# Patient Record
Sex: MALE | Race: WHITE
[De-identification: names, ages, dates, MRNs, and addresses within clinical notes are randomized per-mention and may not be internally consistent; named-entity substitution may affect disease eponyms.]

---

## 2022-06-17 ENCOUNTER — HOSPITAL ENCOUNTER (INPATIENT)
Dept: HOSPITAL 4 - SED | Age: 72
LOS: 5 days | Discharge: NURSING, REHAB OR LONG-TERM CARE | DRG: 481 | End: 2022-06-22
Attending: INTERNAL MEDICINE | Admitting: INTERNAL MEDICINE
Payer: COMMERCIAL

## 2022-06-17 VITALS — BODY MASS INDEX: 31.08 KG/M2 | HEIGHT: 75 IN | WEIGHT: 250 LBS

## 2022-06-17 VITALS — SYSTOLIC BLOOD PRESSURE: 135 MMHG

## 2022-06-17 DIAGNOSIS — D69.9: ICD-10-CM
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
## 2022-06-17 DIAGNOSIS — I42.0: ICD-10-CM

## 2022-06-17 DIAGNOSIS — Z79.899: ICD-10-CM
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
## 2022-06-17 DIAGNOSIS — E66.9: ICD-10-CM

## 2022-06-17 DIAGNOSIS — E78.5: ICD-10-CM

## 2022-06-17 DIAGNOSIS — I48.92: ICD-10-CM

## 2022-06-17 DIAGNOSIS — Z20.822: ICD-10-CM

## 2022-06-17 DIAGNOSIS — I50.9: ICD-10-CM
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
## 2022-06-17 DIAGNOSIS — I11.0: ICD-10-CM

## 2022-06-17 DIAGNOSIS — S72.001A: Primary | ICD-10-CM

## 2022-06-17 DIAGNOSIS — I48.20: ICD-10-CM

## 2022-06-17 LAB
ALBUMIN SERPL BCP-MCNC: 3.6 G/DL (ref 3.4–4.8)
ALT SERPL W P-5'-P-CCNC: 14 U/L (ref 12–78)
ANION GAP SERPL CALCULATED.3IONS-SCNC: 6 MMOL/L (ref 5–15)
AST SERPL W P-5'-P-CCNC: 12 U/L (ref 10–37)
BASOPHILS # BLD AUTO: 0 K/UL (ref 0–0.2)
BASOPHILS NFR BLD AUTO: 0.4 % (ref 0–2)
BILIRUB SERPL-MCNC: 1.2 MG/DL (ref 0–1)
BUN SERPL-MCNC: 17 MG/DL (ref 8–21)
CALCIUM SERPL-MCNC: 8.1 MG/DL (ref 8.4–11)
CHLORIDE SERPL-SCNC: 104 MMOL/L (ref 98–107)
CREAT SERPL-MCNC: 0.96 MG/DL (ref 0.55–1.3)
EOSINOPHIL # BLD AUTO: 0.1 K/UL (ref 0–0.4)
EOSINOPHIL NFR BLD AUTO: 1.4 % (ref 0–4)
ERYTHROCYTE [DISTWIDTH] IN BLOOD BY AUTOMATED COUNT: 14.1 % (ref 9–15)
GFR SERPL CREATININE-BSD FRML MDRD: (no result) ML/MIN (ref 90–?)
GLUCOSE SERPL-MCNC: 150 MG/DL (ref 70–99)
HCT VFR BLD AUTO: 41.4 % (ref 36–54)
HGB BLD-MCNC: 14.3 G/DL (ref 14–18)
INR PPP: 1.1 (ref 0.8–1.2)
LYMPHOCYTES # BLD AUTO: 1.3 K/UL (ref 1–5.5)
LYMPHOCYTES NFR BLD AUTO: 16.3 % (ref 20.5–51.5)
MCH RBC QN AUTO: 30 PG (ref 27–31)
MCHC RBC AUTO-ENTMCNC: 35 % (ref 32–36)
MCV RBC AUTO: 88 FL (ref 79–98)
MONOCYTES # BLD MANUAL: 0.4 K/UL (ref 0–1)
MONOCYTES # BLD MANUAL: 4.6 % (ref 1.7–9.3)
NEUTROPHILS # BLD AUTO: 6.3 K/UL (ref 1.8–7.7)
NEUTROPHILS NFR BLD AUTO: 77.3 % (ref 40–70)
PLATELET # BLD AUTO: 149 K/UL (ref 130–430)
POTASSIUM SERPL-SCNC: 3.6 MMOL/L (ref 3.5–5.1)
PROTHROMBIN TIME: 11.8 SECS (ref 9.5–12.5)
RBC # BLD AUTO: 4.72 MIL/UL (ref 4.2–6.2)
SODIUM SERPLBLD-SCNC: 139 MMOL/L (ref 136–145)
WBC # BLD AUTO: 8.2 K/UL (ref 4.8–10.8)

## 2022-06-17 PROCEDURE — G0378 HOSPITAL OBSERVATION PER HR: HCPCS

## 2022-06-17 PROCEDURE — C1713 ANCHOR/SCREW BN/BN,TIS/BN: HCPCS

## 2022-06-17 PROCEDURE — G0482 DRUG TEST DEF 15-21 CLASSES: HCPCS

## 2022-06-17 NOTE — NUR
Pt to bed 8 via EMS w/ c/o right hip pain s/p mechanical fall from 2nd step on 
stairs to ground. Normal skin color for rotation. Pt maintains full mobility of 
right leg. No external rotation or shortening of RLE

## 2022-06-18 VITALS — SYSTOLIC BLOOD PRESSURE: 151 MMHG

## 2022-06-18 VITALS — SYSTOLIC BLOOD PRESSURE: 135 MMHG

## 2022-06-18 VITALS — SYSTOLIC BLOOD PRESSURE: 154 MMHG

## 2022-06-18 VITALS — SYSTOLIC BLOOD PRESSURE: 155 MMHG

## 2022-06-18 VITALS — SYSTOLIC BLOOD PRESSURE: 148 MMHG

## 2022-06-18 LAB
ALBUMIN SERPL BCP-MCNC: 3.6 G/DL (ref 3.4–4.8)
ALT SERPL W P-5'-P-CCNC: 20 U/L (ref 12–78)
AMPHETAMINES UR QL SCN: NEGATIVE
AMYLASE SERPL-CCNC: 36 U/L (ref 0–100)
ANION GAP SERPL CALCULATED.3IONS-SCNC: 7 MMOL/L (ref 5–15)
APPEARANCE UR: CLEAR
AST SERPL W P-5'-P-CCNC: 11 U/L (ref 10–37)
BARBITURATES UR QL SCN: NEGATIVE
BENZODIAZ UR QL SCN: NEGATIVE
BILIRUB SERPL-MCNC: 1.5 MG/DL (ref 0–1)
BILIRUB UR QL STRIP: NEGATIVE
BUN SERPL-MCNC: 15 MG/DL (ref 8–21)
BZE UR QL SCN: NEGATIVE
CALCIUM SERPL-MCNC: 7.9 MG/DL (ref 8.4–11)
CANNABINOIDS UR QL SCN: NEGATIVE
CHLORIDE SERPL-SCNC: 105 MMOL/L (ref 98–107)
CHOLEST SERPL-MCNC: 160 MG/DL (ref ?–200)
COLOR UR: YELLOW
CREAT SERPL-MCNC: 0.82 MG/DL (ref 0.55–1.3)
ETHANOL SERPL-MCNC: < 3 MG/DL (ref ?–10)
GFR SERPL CREATININE-BSD FRML MDRD: (no result) ML/MIN (ref 90–?)
GLUCOSE SERPL-MCNC: 132 MG/DL (ref 70–99)
GLUCOSE UR STRIP-MCNC: NEGATIVE MG/DL
HDLC SERPL-MCNC: 26 MG/DL (ref 45–?)
HGB UR QL STRIP: NEGATIVE
INR PPP: 1.2 (ref 0.8–1.2)
KETONES UR STRIP-MCNC: NEGATIVE MG/DL
LDL CHOLESTEROL: 115 MG/DL (ref ?–100)
LEUKOCYTE ESTERASE UR QL STRIP: NEGATIVE
LIPASE SERPL-CCNC: 71 U/L (ref 73–393)
METHADONE UR-SCNC: NEGATIVE UMOL/L
METHAMPHET UR-SCNC: NEGATIVE UMOL/L
NITRITE UR QL STRIP: NEGATIVE
OPIATES UR QL SCN: POSITIVE
OXYCODONE SERPL-MCNC: NEGATIVE NG/ML
PCP UR QL SCN: NEGATIVE
PH UR STRIP: 6 [PH] (ref 5–8)
PHOSPHATE SERPL-MCNC: 3.3 MG/DL (ref 2.7–4.5)
POTASSIUM SERPL-SCNC: 4.2 MMOL/L (ref 3.5–5.1)
PROT UR QL STRIP: NEGATIVE
PROTHROMBIN TIME: 12 SECS (ref 9.5–12.5)
SODIUM SERPLBLD-SCNC: 141 MMOL/L (ref 136–145)
SP GR UR STRIP: 1.02 (ref 1–1.03)
T4 FREE SERPL-MCNC: 1.1 NG/DL (ref 0.8–1.5)
TRICYCLICS UR-MCNC: NEGATIVE NG/ML
TRIGL SERPL-MCNC: 90 MG/DL (ref 30–150)
TSH SERPL DL<=0.05 MIU/L-ACNC: 3.66 UIU/ML (ref 0.36–3.74)
URINE PROPOXYPHENE SCREEN: NEGATIVE
UROBILINOGEN UR STRIP-MCNC: 0.2 MG/DL (ref 0.2–1)

## 2022-06-18 RX ADMIN — CARVEDILOL SCH MG: 25 TABLET, FILM COATED ORAL at 20:52

## 2022-06-18 RX ADMIN — MORPHINE SULFATE PRN MG: 4 INJECTION INTRAVENOUS at 17:53

## 2022-06-18 RX ADMIN — MORPHINE SULFATE PRN MG: 4 INJECTION INTRAVENOUS at 13:21

## 2022-06-18 RX ADMIN — MORPHINE SULFATE PRN MG: 4 INJECTION INTRAVENOUS at 21:38

## 2022-06-18 RX ADMIN — AMIODARONE HYDROCHLORIDE SCH MG: 200 TABLET ORAL at 20:53

## 2022-06-18 NOTE — NUR
Received report from TIM Rick; assumed patient care at this time. Patient A/Ox4 
and resting quietly. Vital signs within normal range. Pt aware his is awaiting 
a bed in tele. No acute distress noted.

## 2022-06-18 NOTE — NUR
Patient will be admitted to care of Dr Lopez.  Admitted to tele unit.  Will go to 
room [address]A.  Belongings list completed.  Complete and up to date summary report 
printed. Patient transport on gurney by RN. SBAR report to be given at bedside 
with opportunity for questions.

## 2022-06-18 NOTE — NUR
Admit bed requested 



Patient will be admitted to care of Dr. Carlson 

Admitted to Tele unit.

Diagnosis Hip Fracure

Inpatient (Yes or No)  yes

Observation (Yes or No) no

Orientation concerns or request close to nursing station  (Yes or No) no

Covid Status negative

On vent or bipap no

Isolation requirements no

Needs a sitter no

From Home (Yes or if No enter name of facility) yes

Requires Dialysis (Yes or No) no 

Med Rec Completed (Yes of No) yes

## 2022-06-18 NOTE — NUR
Placed call to OR and Dr Platt re ETA for surgery and clarification re PO 
meds. Lasix to be held per Dr Platt. Other meds to be administered PO with 
sips of water.

## 2022-06-18 NOTE — NUR
Mr Platt at the bedside, obtained informed consent for right hip replacement surgery. Risks 
and benefits discussed by MD. Also anesthesiologist at the bedside and informed consent 
obtained. Pt to have dinner but npo after 10pm. pt verbalized understanding. Will endorse to 
noc shift.

## 2022-06-18 NOTE — NUR
CONSULTATION PAGED/CALLED

Reason for Consultation: []  R HIP FRACTURE

Person Who was Notified: []  DR AARON SIMS

Consulting Physician: []  DR AARON SIMS

Consultant Specialty: []  ORTHO

Ordering Physician: []  DR MONACO

## 2022-06-19 VITALS — SYSTOLIC BLOOD PRESSURE: 130 MMHG

## 2022-06-19 VITALS — SYSTOLIC BLOOD PRESSURE: 190 MMHG

## 2022-06-19 VITALS — SYSTOLIC BLOOD PRESSURE: 137 MMHG

## 2022-06-19 VITALS — SYSTOLIC BLOOD PRESSURE: 135 MMHG

## 2022-06-19 VITALS — SYSTOLIC BLOOD PRESSURE: 132 MMHG

## 2022-06-19 VITALS — SYSTOLIC BLOOD PRESSURE: 100 MMHG

## 2022-06-19 LAB
ANION GAP SERPL CALCULATED.3IONS-SCNC: 7 MMOL/L (ref 5–15)
BASOPHILS # BLD AUTO: 0 K/UL (ref 0–0.2)
BASOPHILS NFR BLD AUTO: 0.3 % (ref 0–2)
BUN SERPL-MCNC: 13 MG/DL (ref 8–21)
CALCIUM SERPL-MCNC: 7.4 MG/DL (ref 8.4–11)
CHLORIDE SERPL-SCNC: 102 MMOL/L (ref 98–107)
CREAT SERPL-MCNC: 0.86 MG/DL (ref 0.55–1.3)
EOSINOPHIL # BLD AUTO: 0.1 K/UL (ref 0–0.4)
EOSINOPHIL NFR BLD AUTO: 1.3 % (ref 0–4)
ERYTHROCYTE [DISTWIDTH] IN BLOOD BY AUTOMATED COUNT: 14.2 % (ref 9–15)
GFR SERPL CREATININE-BSD FRML MDRD: (no result) ML/MIN (ref 90–?)
GLUCOSE SERPL-MCNC: 189 MG/DL (ref 70–99)
HCT VFR BLD AUTO: 39.4 % (ref 36–54)
HGB BLD-MCNC: 13.3 G/DL (ref 14–18)
LYMPHOCYTES # BLD AUTO: 1.3 K/UL (ref 1–5.5)
LYMPHOCYTES NFR BLD AUTO: 13.1 % (ref 20.5–51.5)
MCH RBC QN AUTO: 30 PG (ref 27–31)
MCHC RBC AUTO-ENTMCNC: 34 % (ref 32–36)
MCV RBC AUTO: 89 FL (ref 79–98)
MONOCYTES # BLD MANUAL: 0.6 K/UL (ref 0–1)
MONOCYTES # BLD MANUAL: 6.4 % (ref 1.7–9.3)
NEUTROPHILS # BLD AUTO: 7.8 K/UL (ref 1.8–7.7)
NEUTROPHILS NFR BLD AUTO: 78.9 % (ref 40–70)
PLATELET # BLD AUTO: 132 K/UL (ref 130–430)
POTASSIUM SERPL-SCNC: 4.3 MMOL/L (ref 3.5–5.1)
RBC # BLD AUTO: 4.44 MIL/UL (ref 4.2–6.2)
SODIUM SERPLBLD-SCNC: 137 MMOL/L (ref 136–145)
WBC # BLD AUTO: 9.9 K/UL (ref 4.8–10.8)

## 2022-06-19 PROCEDURE — 0QS606Z REPOSITION RIGHT UPPER FEMUR WITH INTRAMEDULLARY INTERNAL FIXATION DEVICE, OPEN APPROACH: ICD-10-PCS | Performed by: STUDENT IN AN ORGANIZED HEALTH CARE EDUCATION/TRAINING PROGRAM

## 2022-06-19 RX ADMIN — MORPHINE SULFATE PRN MG: 4 INJECTION INTRAVENOUS at 12:02

## 2022-06-19 RX ADMIN — MORPHINE SULFATE PRN MG: 4 INJECTION INTRAVENOUS at 18:37

## 2022-06-19 RX ADMIN — HYDROMORPHONE HYDROCHLORIDE PRN MG: 8 TABLET ORAL at 19:54

## 2022-06-19 RX ADMIN — FUROSEMIDE SCH MG: 40 TABLET ORAL at 09:00

## 2022-06-19 RX ADMIN — APIXABAN SCH MG: 2.5 TABLET, FILM COATED ORAL at 21:25

## 2022-06-19 RX ADMIN — HYDROCODONE BITARTRATE AND ACETAMINOPHEN PRN TAB: 7.5; 325 TABLET ORAL at 21:26

## 2022-06-19 RX ADMIN — AMIODARONE HYDROCHLORIDE SCH MG: 200 TABLET ORAL at 21:00

## 2022-06-19 RX ADMIN — MORPHINE SULFATE PRN MG: 4 INJECTION INTRAVENOUS at 02:34

## 2022-06-19 RX ADMIN — CARVEDILOL SCH MG: 25 TABLET, FILM COATED ORAL at 09:00

## 2022-06-19 RX ADMIN — PANTOPRAZOLE SODIUM SCH MG: 40 TABLET, DELAYED RELEASE ORAL at 09:00

## 2022-06-19 RX ADMIN — AMIODARONE HYDROCHLORIDE SCH MG: 200 TABLET ORAL at 09:00

## 2022-06-19 RX ADMIN — CARVEDILOL SCH MG: 25 TABLET, FILM COATED ORAL at 21:24

## 2022-06-19 NOTE — NUR
0600  Pt. needs met this shift, vss, given chg bath, clean gown in place.  Report given to 
TIM Linda for O.R. transport, pt. leaving now for O.R.  Pt. wife is with pt.  Pt. is 
aaox4 , no acute distress.

## 2022-06-19 NOTE — NUR
1930  Pt. in bed, aaox4, call light in reach

2200  NPO, ivf at 70ml/hr until bag finished, vss

0030  Pt. sleeping, medicated for pain prn with good results. 

0200  Pt. resting quietly, npo, call light in reach, denies pain

## 2022-06-20 VITALS — SYSTOLIC BLOOD PRESSURE: 112 MMHG

## 2022-06-20 VITALS — SYSTOLIC BLOOD PRESSURE: 136 MMHG

## 2022-06-20 VITALS — SYSTOLIC BLOOD PRESSURE: 81 MMHG

## 2022-06-20 VITALS — SYSTOLIC BLOOD PRESSURE: 108 MMHG

## 2022-06-20 VITALS — SYSTOLIC BLOOD PRESSURE: 116 MMHG

## 2022-06-20 LAB
ANION GAP SERPL CALCULATED.3IONS-SCNC: 8 MMOL/L (ref 5–15)
BASOPHILS # BLD AUTO: 0.1 K/UL (ref 0–0.2)
BASOPHILS NFR BLD AUTO: 0.5 % (ref 0–2)
BUN SERPL-MCNC: 20 MG/DL (ref 8–21)
CALCIUM SERPL-MCNC: 8 MG/DL (ref 8.4–11)
CHLORIDE SERPL-SCNC: 102 MMOL/L (ref 98–107)
CREAT SERPL-MCNC: 0.96 MG/DL (ref 0.55–1.3)
EOSINOPHIL # BLD AUTO: 0.2 K/UL (ref 0–0.4)
EOSINOPHIL NFR BLD AUTO: 2.1 % (ref 0–4)
ERYTHROCYTE [DISTWIDTH] IN BLOOD BY AUTOMATED COUNT: 14.3 % (ref 9–15)
GFR SERPL CREATININE-BSD FRML MDRD: (no result) ML/MIN (ref 90–?)
GLUCOSE SERPL-MCNC: 131 MG/DL (ref 70–99)
HCT VFR BLD AUTO: 39.6 % (ref 36–54)
HGB BLD-MCNC: 13.5 G/DL (ref 14–18)
LYMPHOCYTES # BLD AUTO: 1.9 K/UL (ref 1–5.5)
LYMPHOCYTES NFR BLD AUTO: 19.4 % (ref 20.5–51.5)
MCH RBC QN AUTO: 30 PG (ref 27–31)
MCHC RBC AUTO-ENTMCNC: 34 % (ref 32–36)
MCV RBC AUTO: 89 FL (ref 79–98)
MONOCYTES # BLD MANUAL: 0.8 K/UL (ref 0–1)
MONOCYTES # BLD MANUAL: 7.8 % (ref 1.7–9.3)
NEUTROPHILS # BLD AUTO: 7 K/UL (ref 1.8–7.7)
NEUTROPHILS NFR BLD AUTO: 70.2 % (ref 40–70)
PLATELET # BLD AUTO: 126 K/UL (ref 130–430)
POTASSIUM SERPL-SCNC: 4.8 MMOL/L (ref 3.5–5.1)
RBC # BLD AUTO: 4.45 MIL/UL (ref 4.2–6.2)
SODIUM SERPLBLD-SCNC: 135 MMOL/L (ref 136–145)
WBC # BLD AUTO: 10 K/UL (ref 4.8–10.8)

## 2022-06-20 RX ADMIN — HYDROCODONE BITARTRATE AND ACETAMINOPHEN PRN TAB: 7.5; 325 TABLET ORAL at 13:06

## 2022-06-20 RX ADMIN — CARVEDILOL SCH MG: 25 TABLET, FILM COATED ORAL at 21:41

## 2022-06-20 RX ADMIN — HYDROCODONE BITARTRATE AND ACETAMINOPHEN PRN TAB: 7.5; 325 TABLET ORAL at 21:41

## 2022-06-20 RX ADMIN — AMIODARONE HYDROCHLORIDE SCH MG: 200 TABLET ORAL at 21:40

## 2022-06-20 RX ADMIN — CARVEDILOL SCH MG: 25 TABLET, FILM COATED ORAL at 09:00

## 2022-06-20 RX ADMIN — PANTOPRAZOLE SODIUM SCH MG: 40 TABLET, DELAYED RELEASE ORAL at 09:15

## 2022-06-20 RX ADMIN — APIXABAN SCH MG: 2.5 TABLET, FILM COATED ORAL at 21:42

## 2022-06-20 RX ADMIN — APIXABAN SCH MG: 2.5 TABLET, FILM COATED ORAL at 09:16

## 2022-06-20 RX ADMIN — AMIODARONE HYDROCHLORIDE SCH MG: 200 TABLET ORAL at 09:14

## 2022-06-20 RX ADMIN — FUROSEMIDE SCH MG: 40 TABLET ORAL at 09:00

## 2022-06-20 NOTE — NUR
***** PHYSICAL THERAPY CO-SIGN *****

The Physical Therapy Progress Notes documented by Physical Therapy Assistant have been 
reviewed.



Reviewed/Co-Signed by:  Johnny Harrison

Documentation Done by: SONAL PETERS PTA

-------------------------------------------------------------------------------

Addendum: 06/20/22 at 1804 by Johnny Harrison PT

-------------------------------------------------------------------------------

Amended: Links added.

## 2022-06-20 NOTE — NUR
0600  Pt. needs met this shift, vss, no temp at this time,  pt. states he does not feel like 
he has to void, denies discomfort in bladder area, will endorse to day shift to monitor.  No 
pain noted, call light in reach

## 2022-06-20 NOTE — NUR
Initial Notes 

patient awake, alert, and oriented. Patient denies any pain. No distress noted. No s/s of 
SOB. Spo2 is at 95% on 4LO2 via NC. Patient is eating breakfast, HOB is elevated. Blood 
pressure low, MD aware. No s/s of hypotension noted. Patient states, "feels fine." Denies 
dizziness. Safety precautions in place and call light within reach.

## 2022-06-20 NOTE — NUR
Notes 

Patient still has low blood pressure. Dr. Carlson aware, orders received. Bolus 
administered. Will continue to monitor blood pressure.

## 2022-06-20 NOTE — NUR
Notes 

Vital signs stable, within normal value. Patient denies any pain, distress, or discomfort. 
No s/s of SOB. Patient is eating lunch, HOB elevated. Safety precautions in place and call 
light within reach.

## 2022-06-20 NOTE — NUR
Notes 

Patient is resting. Treatment on incision site done. Patient in a comfortable position. No 
s/s of distress noted. Patient on Room air, VS within range. Safety precautions in place and 
call light within reach.

## 2022-06-20 NOTE — NUR
CLOSING NOTES 

PATIENT IS EATING DINNER, NO S/S OF DISTRESS. DENIES ANY PAIN AT THIS TIME. NO SOB NOTED. 
LAST VITAL SIGNS WITHIN RANGE. SAFETY PRECAUTIONS IN PLACE AND CALL LIGHT WITHIN RANGE. 
FAMILY AT BEDSIDE.

## 2022-06-20 NOTE — NUR
1930  Pt. in bed, aaox4, no acute distress, fever is noted, given tylenol 500mg po

2200  Fever still noted, cooling measures started, call to Dr. Platt who deferred to 
primary Md.  Dr. Lopez gave order for one time dose of toradol 15mg ivp for fever if still 
noted later.  

0030  Pt. given toradol 15mg ivp for fever, pain is noted to r hip.

0200  Pt. sleeping, call light in reach, temp wnl.

## 2022-06-21 VITALS — SYSTOLIC BLOOD PRESSURE: 133 MMHG

## 2022-06-21 VITALS — SYSTOLIC BLOOD PRESSURE: 134 MMHG

## 2022-06-21 VITALS — SYSTOLIC BLOOD PRESSURE: 136 MMHG

## 2022-06-21 VITALS — SYSTOLIC BLOOD PRESSURE: 138 MMHG

## 2022-06-21 VITALS — SYSTOLIC BLOOD PRESSURE: 117 MMHG

## 2022-06-21 LAB
ANION GAP SERPL CALCULATED.3IONS-SCNC: 8 MMOL/L (ref 5–15)
BASOPHILS # BLD AUTO: 0 K/UL (ref 0–0.2)
BASOPHILS NFR BLD AUTO: 0.6 % (ref 0–2)
BUN SERPL-MCNC: 20 MG/DL (ref 8–21)
CALCIUM SERPL-MCNC: 7.5 MG/DL (ref 8.4–11)
CHLORIDE SERPL-SCNC: 100 MMOL/L (ref 98–107)
CREAT SERPL-MCNC: 0.72 MG/DL (ref 0.55–1.3)
EOSINOPHIL # BLD AUTO: 0.2 K/UL (ref 0–0.4)
EOSINOPHIL NFR BLD AUTO: 2.9 % (ref 0–4)
ERYTHROCYTE [DISTWIDTH] IN BLOOD BY AUTOMATED COUNT: 14 % (ref 9–15)
GFR SERPL CREATININE-BSD FRML MDRD: (no result) ML/MIN (ref 90–?)
GLUCOSE SERPL-MCNC: 172 MG/DL (ref 70–99)
HCT VFR BLD AUTO: 32.4 % (ref 36–54)
HGB BLD-MCNC: 11.1 G/DL (ref 14–18)
LYMPHOCYTES # BLD AUTO: 1.3 K/UL (ref 1–5.5)
LYMPHOCYTES NFR BLD AUTO: 21.2 % (ref 20.5–51.5)
MCH RBC QN AUTO: 30 PG (ref 27–31)
MCHC RBC AUTO-ENTMCNC: 34 % (ref 32–36)
MCV RBC AUTO: 88 FL (ref 79–98)
MONOCYTES # BLD MANUAL: 0.6 K/UL (ref 0–1)
MONOCYTES # BLD MANUAL: 9 % (ref 1.7–9.3)
NEUTROPHILS # BLD AUTO: 4.2 K/UL (ref 1.8–7.7)
NEUTROPHILS NFR BLD AUTO: 66.3 % (ref 40–70)
PLATELET # BLD AUTO: 98 K/UL (ref 130–430)
POTASSIUM SERPL-SCNC: 3.6 MMOL/L (ref 3.5–5.1)
RBC # BLD AUTO: 3.66 MIL/UL (ref 4.2–6.2)
SODIUM SERPLBLD-SCNC: 132 MMOL/L (ref 136–145)
WBC # BLD AUTO: 6.3 K/UL (ref 4.8–10.8)

## 2022-06-21 RX ADMIN — PANTOPRAZOLE SODIUM SCH MG: 40 TABLET, DELAYED RELEASE ORAL at 09:44

## 2022-06-21 RX ADMIN — CARVEDILOL SCH MG: 25 TABLET, FILM COATED ORAL at 09:45

## 2022-06-21 RX ADMIN — HYDROMORPHONE HYDROCHLORIDE PRN MG: 8 TABLET ORAL at 00:33

## 2022-06-21 RX ADMIN — AMIODARONE HYDROCHLORIDE SCH MG: 200 TABLET ORAL at 09:44

## 2022-06-21 RX ADMIN — HYDROCODONE BITARTRATE AND ACETAMINOPHEN PRN TAB: 7.5; 325 TABLET ORAL at 20:35

## 2022-06-21 RX ADMIN — APIXABAN SCH MG: 2.5 TABLET, FILM COATED ORAL at 20:38

## 2022-06-21 RX ADMIN — CARVEDILOL SCH MG: 25 TABLET, FILM COATED ORAL at 20:35

## 2022-06-21 RX ADMIN — AMIODARONE HYDROCHLORIDE SCH MG: 200 TABLET ORAL at 20:35

## 2022-06-21 RX ADMIN — FUROSEMIDE SCH MG: 40 TABLET ORAL at 09:44

## 2022-06-21 RX ADMIN — APIXABAN SCH MG: 2.5 TABLET, FILM COATED ORAL at 09:46

## 2022-06-21 RX ADMIN — HYDROCODONE BITARTRATE AND ACETAMINOPHEN PRN TAB: 7.5; 325 TABLET ORAL at 16:20

## 2022-06-21 NOTE — NUR
***** PHYSICAL THERAPY CO-SIGN *****

The Physical Therapy Progress Notes documented by Physical Therapy Assistant have been 
reviewed.



Reviewed/Co-Signed by:  Johnny Harrison

Documentation Done by: KENDY PETERS PTA

-------------------------------------------------------------------------------

Addendum: 06/21/22 at 1714 by Johnny Harrison PT

-------------------------------------------------------------------------------

Amended: Links added.

## 2022-06-21 NOTE — NUR
Patient improving, improved mobility and pain management. Patient worked with PT and shows 
signs of healing. Patient states he does not want to go to a rehab center and want to go 
home when discharged.

## 2022-06-22 VITALS — SYSTOLIC BLOOD PRESSURE: 140 MMHG

## 2022-06-22 VITALS — SYSTOLIC BLOOD PRESSURE: 137 MMHG

## 2022-06-22 VITALS — SYSTOLIC BLOOD PRESSURE: 131 MMHG

## 2022-06-22 LAB
ANION GAP SERPL CALCULATED.3IONS-SCNC: 6 MMOL/L (ref 5–15)
BASOPHILS # BLD AUTO: 0 K/UL (ref 0–0.2)
BASOPHILS NFR BLD AUTO: 0.8 % (ref 0–2)
BUN SERPL-MCNC: 15 MG/DL (ref 8–21)
CALCIUM SERPL-MCNC: 7.3 MG/DL (ref 8.4–11)
CHLORIDE SERPL-SCNC: 101 MMOL/L (ref 98–107)
CREAT SERPL-MCNC: 0.73 MG/DL (ref 0.55–1.3)
EOSINOPHIL # BLD AUTO: 0.2 K/UL (ref 0–0.4)
EOSINOPHIL NFR BLD AUTO: 3.1 % (ref 0–4)
ERYTHROCYTE [DISTWIDTH] IN BLOOD BY AUTOMATED COUNT: 14.2 % (ref 9–15)
GFR SERPL CREATININE-BSD FRML MDRD: (no result) ML/MIN (ref 90–?)
GLUCOSE SERPL-MCNC: 137 MG/DL (ref 70–99)
HCT VFR BLD AUTO: 31.7 % (ref 36–54)
HGB BLD-MCNC: 11.1 G/DL (ref 14–18)
LYMPHOCYTES # BLD AUTO: 1 K/UL (ref 1–5.5)
LYMPHOCYTES NFR BLD AUTO: 16.2 % (ref 20.5–51.5)
MCH RBC QN AUTO: 31 PG (ref 27–31)
MCHC RBC AUTO-ENTMCNC: 35 % (ref 32–36)
MCV RBC AUTO: 87 FL (ref 79–98)
MONOCYTES # BLD MANUAL: 0.6 K/UL (ref 0–1)
MONOCYTES # BLD MANUAL: 9.4 % (ref 1.7–9.3)
NEUTROPHILS # BLD AUTO: 4.1 K/UL (ref 1.8–7.7)
NEUTROPHILS NFR BLD AUTO: 70.5 % (ref 40–70)
PLATELET # BLD AUTO: 128 K/UL (ref 130–430)
POTASSIUM SERPL-SCNC: 3.7 MMOL/L (ref 3.5–5.1)
RBC # BLD AUTO: 3.63 MIL/UL (ref 4.2–6.2)
SODIUM SERPLBLD-SCNC: 134 MMOL/L (ref 136–145)
WBC # BLD AUTO: 5.9 K/UL (ref 4.8–10.8)

## 2022-06-22 RX ADMIN — CARVEDILOL SCH MG: 25 TABLET, FILM COATED ORAL at 09:32

## 2022-06-22 RX ADMIN — FUROSEMIDE SCH MG: 40 TABLET ORAL at 09:31

## 2022-06-22 RX ADMIN — HYDROCODONE BITARTRATE AND ACETAMINOPHEN PRN TAB: 7.5; 325 TABLET ORAL at 13:52

## 2022-06-22 RX ADMIN — PANTOPRAZOLE SODIUM SCH MG: 40 TABLET, DELAYED RELEASE ORAL at 09:31

## 2022-06-22 RX ADMIN — APIXABAN SCH MG: 2.5 TABLET, FILM COATED ORAL at 09:33

## 2022-06-22 RX ADMIN — AMIODARONE HYDROCHLORIDE SCH MG: 200 TABLET ORAL at 09:32

## 2022-06-22 NOTE — NUR
[facility]/HCP SARAN ACUÑA INFORMED THAT SNF PLACEMENT IS WITH [facility], [address]

I STEVEUBLANLUCRETIA WILL  PT AT 1500.

## 2022-06-22 NOTE — NUR
SHIRA FOR [facility] CALLED TO FAX THE HCP SPECIAL ORDER OF [facility] MD DR MONACO.  

TWO TRANSMITTAL CONFIRMATIONS WERE RECEIVED.. THE 2ND ONE RECEIVED BY ROBERTA WILL WALK THE 
FAXED ORDER

TO SHIRA IN THE 2ND FLOOR WHERE THE PT IS LOCATED ( B), TO MAKE SURE THAT SHIRA THE 
NURSE WILL GET

THE ORDER.

## 2022-06-22 NOTE — NUR
patient was picked up and transferred to SNF. Patient stable at the time of DC, minimal pain 
post norco. Patient ambualted to PT earlier in the day. Being transferred for continued PT 
treatment.